# Patient Record
Sex: MALE | Race: BLACK OR AFRICAN AMERICAN | NOT HISPANIC OR LATINO | Employment: UNEMPLOYED | URBAN - METROPOLITAN AREA
[De-identification: names, ages, dates, MRNs, and addresses within clinical notes are randomized per-mention and may not be internally consistent; named-entity substitution may affect disease eponyms.]

---

## 2020-06-25 PROBLEM — E11.40 DIABETIC NEUROPATHY: Status: ACTIVE | Noted: 2020-06-25

## 2022-08-03 PROBLEM — F17.200 SMOKING ADDICTION: Status: ACTIVE | Noted: 2022-08-03

## 2022-11-28 PROBLEM — K52.9 CHRONIC DIARRHEA OF UNKNOWN ORIGIN: Status: ACTIVE | Noted: 2022-11-28

## 2022-11-28 LAB — CRC RECOMMENDATION EXT: NORMAL

## 2023-01-11 DIAGNOSIS — E11.9 TYPE 2 DIABETES MELLITUS WITHOUT COMPLICATION: ICD-10-CM

## 2023-01-12 ENCOUNTER — PATIENT OUTREACH (OUTPATIENT)
Dept: ADMINISTRATIVE | Facility: HOSPITAL | Age: 53
End: 2023-01-12

## 2023-01-12 NOTE — PROGRESS NOTES
Population Health Outreach.Records Received, hyper-linked into chart at this time. The following record(s)  below were uploaded for Health Maintenance .    11/28/22-colonoscopy

## 2023-01-13 ENCOUNTER — PATIENT MESSAGE (OUTPATIENT)
Dept: ADMINISTRATIVE | Facility: HOSPITAL | Age: 53
End: 2023-01-13

## 2023-01-18 DIAGNOSIS — E11.9 TYPE 2 DIABETES MELLITUS WITHOUT COMPLICATION: ICD-10-CM

## 2023-01-20 ENCOUNTER — PATIENT MESSAGE (OUTPATIENT)
Dept: ADMINISTRATIVE | Facility: HOSPITAL | Age: 53
End: 2023-01-20

## 2023-10-20 PROBLEM — H25.812 COMBINED FORM OF AGE-RELATED CATARACT, LEFT EYE: Status: ACTIVE | Noted: 2023-10-20

## 2024-02-23 PROBLEM — H25.811 COMBINED FORM OF AGE-RELATED CATARACT, RIGHT EYE: Status: ACTIVE | Noted: 2024-02-23

## 2024-03-06 PROBLEM — R10.13 EPIGASTRIC PAIN: Status: ACTIVE | Noted: 2024-03-06

## 2024-03-06 PROBLEM — R14.0 BLOATING: Status: ACTIVE | Noted: 2024-03-06

## 2024-03-06 PROBLEM — K80.20 CALCULUS OF GALLBLADDER WITHOUT CHOLECYSTITIS WITHOUT OBSTRUCTION: Status: ACTIVE | Noted: 2024-03-06

## 2024-05-14 PROBLEM — H25.813 COMBINED FORM OF AGE-RELATED CATARACT, BOTH EYES: Status: ACTIVE | Noted: 2024-02-23

## 2024-05-14 PROBLEM — H25.812 COMBINED FORM OF AGE-RELATED CATARACT, LEFT EYE: Status: RESOLVED | Noted: 2023-10-20 | Resolved: 2024-05-14

## 2024-07-13 PROBLEM — K52.9 CHRONIC DIARRHEA OF UNKNOWN ORIGIN: Status: ACTIVE | Noted: 2024-07-13

## 2024-07-13 PROBLEM — R63.4 WEIGHT LOSS: Status: ACTIVE | Noted: 2024-07-13

## 2024-07-25 PROBLEM — Z90.49 S/P CHOLECYSTECTOMY: Status: ACTIVE | Noted: 2024-04-03

## 2024-07-25 PROBLEM — D12.6 TUBULAR ADENOMA OF COLON: Status: ACTIVE | Noted: 2024-07-11

## 2024-07-25 PROBLEM — K80.20 CALCULUS OF GALLBLADDER WITHOUT CHOLECYSTITIS WITHOUT OBSTRUCTION: Status: RESOLVED | Noted: 2024-03-06 | Resolved: 2024-07-25

## 2024-09-23 PROBLEM — K52.9 CHRONIC DIARRHEA: Status: RESOLVED | Noted: 2022-11-28 | Resolved: 2024-09-23

## 2024-09-23 PROBLEM — R14.0 BLOATING: Status: RESOLVED | Noted: 2024-03-06 | Resolved: 2024-09-23

## 2024-09-23 PROBLEM — R10.13 EPIGASTRIC PAIN: Status: RESOLVED | Noted: 2024-03-06 | Resolved: 2024-09-23

## 2024-11-25 ENCOUNTER — PATIENT OUTREACH (OUTPATIENT)
Dept: ADMINISTRATIVE | Facility: HOSPITAL | Age: 54
End: 2024-11-25

## 2024-11-25 DIAGNOSIS — E11.42 TYPE 2 DIABETES MELLITUS WITH DIABETIC POLYNEUROPATHY, WITHOUT LONG-TERM CURRENT USE OF INSULIN: Primary | ICD-10-CM

## 2024-11-26 PROBLEM — E78.5 HYPERLIPIDEMIA LDL GOAL <70: Status: ACTIVE | Noted: 2024-11-26

## 2025-05-08 ENCOUNTER — PATIENT MESSAGE (OUTPATIENT)
Dept: ADMINISTRATIVE | Facility: OTHER | Age: 55
End: 2025-05-08

## 2025-06-12 ENCOUNTER — PATIENT OUTREACH (OUTPATIENT)
Dept: ADMINISTRATIVE | Facility: HOSPITAL | Age: 55
End: 2025-06-12

## 2025-06-12 DIAGNOSIS — E11.42 TYPE 2 DIABETES MELLITUS WITH DIABETIC POLYNEUROPATHY, WITHOUT LONG-TERM CURRENT USE OF INSULIN: Primary | ICD-10-CM

## 2025-06-12 DIAGNOSIS — I43 DIABETIC CARDIOMYOPATHY IN TYPE 2 DIABETES MELLITUS: ICD-10-CM

## 2025-06-12 DIAGNOSIS — E11.69 DIABETIC CARDIOMYOPATHY IN TYPE 2 DIABETES MELLITUS: ICD-10-CM

## 2025-06-12 DIAGNOSIS — E11.69 TYPE 2 DIABETES MELLITUS WITH OTHER SPECIFIED COMPLICATION, WITHOUT LONG-TERM CURRENT USE OF INSULIN: ICD-10-CM

## 2025-06-13 PROBLEM — F10.90 ALCOHOL USE DISORDER: Status: ACTIVE | Noted: 2025-06-13

## 2025-06-13 PROBLEM — R74.01 TRANSAMINITIS: Status: ACTIVE | Noted: 2025-06-13

## 2025-07-12 PROBLEM — R29.818 ACUTE FOCAL NEUROLOGICAL DEFICIT: Status: ACTIVE | Noted: 2025-07-12

## 2025-07-12 PROBLEM — I95.1 ORTHOSTATIC HYPOTENSION: Status: ACTIVE | Noted: 2025-07-12

## 2025-07-12 PROBLEM — F32.9 REACTIVE DEPRESSION: Status: ACTIVE | Noted: 2025-07-12

## 2025-07-13 PROBLEM — E11.43 DIABETIC GASTROPARESIS: Status: ACTIVE | Noted: 2025-07-13

## 2025-07-13 PROBLEM — R63.4 WEIGHT LOSS: Status: RESOLVED | Noted: 2024-07-13 | Resolved: 2025-07-13

## 2025-07-13 PROBLEM — K31.84 DIABETIC GASTROPARESIS: Status: ACTIVE | Noted: 2025-07-13

## 2025-07-13 PROBLEM — I65.22 OCCLUSION OF LEFT INTERNAL CAROTID ARTERY: Status: ACTIVE | Noted: 2025-07-13

## 2025-07-13 PROBLEM — K58.0 IRRITABLE BOWEL SYNDROME WITH DIARRHEA: Status: ACTIVE | Noted: 2025-07-13

## 2025-07-13 PROBLEM — R42 DIZZINESS: Status: ACTIVE | Noted: 2025-07-13

## 2025-07-13 PROBLEM — D69.6 THROMBOCYTOPENIA: Status: ACTIVE | Noted: 2025-07-13

## 2025-07-13 PROBLEM — D64.9 ANEMIA, NORMOCYTIC NORMOCHROMIC: Status: ACTIVE | Noted: 2025-07-13

## 2025-07-13 PROBLEM — I63.9: Status: ACTIVE | Noted: 2025-07-13

## 2025-07-13 PROBLEM — R06.09 DYSPNEA ON EXERTION: Status: ACTIVE | Noted: 2025-07-13

## 2025-07-13 PROBLEM — F41.9 ANXIETY: Status: ACTIVE | Noted: 2025-07-13

## 2025-07-13 PROBLEM — Z72.0 TOBACCO ABUSE: Status: ACTIVE | Noted: 2025-07-13

## 2025-07-15 PROBLEM — I95.1 ORTHOSTATIC HYPOTENSION: Status: RESOLVED | Noted: 2025-07-12 | Resolved: 2025-07-15

## 2025-07-15 PROBLEM — R06.09 DYSPNEA ON EXERTION: Status: RESOLVED | Noted: 2025-07-13 | Resolved: 2025-07-15

## 2025-07-15 PROBLEM — R42 DIZZINESS: Status: RESOLVED | Noted: 2025-07-13 | Resolved: 2025-07-15

## 2025-07-15 PROBLEM — R29.818 ACUTE FOCAL NEUROLOGICAL DEFICIT: Status: RESOLVED | Noted: 2025-07-12 | Resolved: 2025-07-15

## 2025-07-15 PROBLEM — R74.01 TRANSAMINITIS: Status: RESOLVED | Noted: 2025-06-13 | Resolved: 2025-07-15

## 2025-08-13 ENCOUNTER — CLINICAL SUPPORT (OUTPATIENT)
Dept: SMOKING CESSATION | Facility: CLINIC | Age: 55
End: 2025-08-13
Payer: MEDICAID

## 2025-08-13 DIAGNOSIS — F17.200 NICOTINE DEPENDENCE: Primary | ICD-10-CM

## 2025-08-13 PROCEDURE — 99404 PREV MED CNSL INDIV APPRX 60: CPT | Mod: S$GLB,,, | Performed by: INTERNAL MEDICINE

## 2025-08-28 ENCOUNTER — PATIENT MESSAGE (OUTPATIENT)
Dept: ADMINISTRATIVE | Facility: OTHER | Age: 55
End: 2025-08-28
Payer: MEDICAID

## 2025-09-03 ENCOUNTER — CLINICAL SUPPORT (OUTPATIENT)
Dept: SMOKING CESSATION | Facility: CLINIC | Age: 55
End: 2025-09-03
Payer: MEDICAID

## 2025-09-03 DIAGNOSIS — F17.200 NICOTINE DEPENDENCE: Primary | ICD-10-CM
